# Patient Record
Sex: FEMALE | URBAN - METROPOLITAN AREA
[De-identification: names, ages, dates, MRNs, and addresses within clinical notes are randomized per-mention and may not be internally consistent; named-entity substitution may affect disease eponyms.]

---

## 2017-08-16 ENCOUNTER — MOHS SURGERY-ROUTINE (OUTPATIENT)
Dept: URBAN - METROPOLITAN AREA CLINIC 7 | Facility: CLINIC | Age: 77
Setting detail: DERMATOLOGY
End: 2017-08-16

## 2017-08-16 DIAGNOSIS — L70.0 ACNE VULGARIS: ICD-10-CM

## 2017-08-16 PROCEDURE — 99202 OFFICE O/P NEW SF 15 MIN: CPT

## 2017-08-16 PROCEDURE — 14041 TIS TRNFR F/C/C/M/N/A/G/H/F: CPT

## 2017-08-16 PROCEDURE — 17311 MOHS 1 STAGE H/N/HF/G: CPT

## 2017-08-23 ENCOUNTER — SUTURE REMOVAL (OUTPATIENT)
Dept: URBAN - METROPOLITAN AREA CLINIC 7 | Facility: CLINIC | Age: 77
Setting detail: DERMATOLOGY
End: 2017-08-23

## 2017-08-23 DIAGNOSIS — Z79.899 OTHER LONG-TERM (CURRENT) DRUG THERAPY: ICD-10-CM

## 2017-08-23 DIAGNOSIS — L70.0 ACNE VULGARIS: ICD-10-CM

## 2017-08-23 DIAGNOSIS — L85.3 XEROSIS CUTIS: ICD-10-CM

## 2017-08-23 PROCEDURE — 99024 POSTOP FOLLOW-UP VISIT: CPT

## 2021-04-27 ENCOUNTER — TRANSCRIBE ORDER (OUTPATIENT)
Dept: REGISTRATION | Age: 81
End: 2021-04-27

## 2021-04-27 ENCOUNTER — HOSPITAL ENCOUNTER (OUTPATIENT)
Dept: LAB | Age: 81
Discharge: HOME | End: 2021-04-27
Payer: MEDICARE

## 2021-04-27 DIAGNOSIS — R41.3 MEMORY LOSS: Primary | ICD-10-CM

## 2021-04-27 DIAGNOSIS — R41.3 MEMORY LOSS: ICD-10-CM

## 2021-04-27 LAB — VIT B12 SERPL-MCNC: 327 PG/ML (ref 211–911)

## 2021-04-27 PROCEDURE — 36415 COLL VENOUS BLD VENIPUNCTURE: CPT

## 2021-04-27 PROCEDURE — 86780 TREPONEMA PALLIDUM: CPT

## 2021-04-27 PROCEDURE — 82607 VITAMIN B-12: CPT

## 2021-04-28 LAB — T PALLIDUM AB SER QL IA: NONREACTIVE

## 2021-04-29 LAB
FAX TO INFO,FAXT: NORMAL
FAX TO NUMBER,FAXN: NORMAL

## 2021-06-14 ENCOUNTER — OFFICE VISIT (OUTPATIENT)
Dept: NEUROLOGY | Age: 81
End: 2021-06-14
Payer: MEDICARE

## 2021-06-14 DIAGNOSIS — R41.9 DEFICIT IN COMPREHENSION: ICD-10-CM

## 2021-06-14 DIAGNOSIS — F41.8 ANXIETY ABOUT HEALTH: ICD-10-CM

## 2021-06-14 DIAGNOSIS — R41.3 MEMORY LOSS: Primary | ICD-10-CM

## 2021-06-14 DIAGNOSIS — F81.9 LEARNING DIFFICULTY DUE TO COGNITIVE LIMITATIONS: ICD-10-CM

## 2021-06-14 DIAGNOSIS — R41.844 EXECUTIVE FUNCTION DEFICIT: ICD-10-CM

## 2021-06-14 PROCEDURE — 90791 PSYCH DIAGNOSTIC EVALUATION: CPT | Performed by: PSYCHOLOGIST

## 2021-06-14 NOTE — PROGRESS NOTES
[facility]  Neuroscience   [facility], [address]  Office:  [phone]  Fax: [phone]                  Initial Office Exam  Patient Name: Nicolas Wolff  Age: 68 y.o. Gender: female   Handedness: right handed   Presenting Concern: cognitive complaints  Primary Care Physician: Nubia Rizvi MD  Referring Provider: Lolita Mesa MD      REASON FOR REFERRAL:  This comprehensive and medically necessary neuropsychological assessment was requested to assist a differential diagnosis of cognitive complaints. The use and purpose of this examination, as well as the extent and limitations of confidentiality, were explained prior to obtaining permission to participate. Instructions were provided regarding the necessity to put forth optimal effort and answer questions truthfully in order to obtain reliable and accurate test results. REVIEW OF RECORDS:  Ms. 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 Memory loss first emerged approximately 2 years ago. History is significant for hypertension and diabetes. Socially, Ms. Kedar Hoffman recently moved from [place] to [place] to live with her daughter. ADLs are intact though Ms. Concepcion no longer drives. Behavioral changes and hallucinations have been denied. Medications were reviewed and include aspirin 81 mg, losartan potassium 50 mg, and alprazolam 0.25 mg. An EEG has been ordered. An MRI on 5/14/21 showed the following: There is scattered abnormal FLAIR and T2 signal intensity seen within the subcortical and deep white matter. Given patient's age this is likely the sequelae of chronic microvascular ischemic disease change. There is age-appropriate cerebral and cerebellar atrophy. CLINICAL INTERVIEW:  Ms. Kedar Hoffman was accompanied by her daughter for her initial interview.   Consistent with records, they reported memory loss which has been worsening over the previous 2 years. Neurologic history is negative for seizures, stroke, syncope, and significant head trauma. Sleep disturbance was denied though occasional snoring was reported. Pain complaints were denied. There is no significant history of alcohol or illicit substance use. Tobacco use ceased 40 years ago. Family history is significant for dementia and 2 maternal aunts and stroke in the sister and mother. With regard to emotional functioning, there is no history of psychiatric hospitalization, suicidal ideation, self-harm behaviors, or psychological trauma. However, Ms. Ten Guerrero has been demonstrating anxiety and frustration related to the sale of her home in [place]. For this reason, as needed alprazolam has been used daily. Socially, Ms. Ten Guerrero resides with her daughter. She has been  and  3 times. She currently has a boyfriend who lives in [place]. Ms. Ten Guerrero has 2 adult children. Academically, she completed 7 years of education and subsequently returned for her GED. She worked in the [employer] until 2002. Hobbies include reading. Ms. Ten Guerrero is making some new friends in [place] and still stays connected with her friends from [place]. She is not currently exercising. Functionally, Ms. Ten Guerrero is dependent on her daughter for medication management and bill payment. Her daughter maintains medical and financial POA. Ms. Ten Guerrero no longer drives.       MENTAL STATUS:    Sensorium  Awake, Aware, Alert   Orientation person and situation   Relations cooperative and passive   Eye Contact appropriate   Appearance:  age appropriate   Motor Behavior:  within normal limits   Speech:  normal pitch and normal volume   Vocabulary average   Thought Process: within normal limits   Thought Content free of delusions and free of hallucinations   Suicidal ideations none   Homicidal ideations none   Mood:  euthymic   Affect:  mood-congruent   Memory recent  impaired   Memory remote:  impaired   Concentration:  impaired   Abstraction:  abstract   Insight:  fair   Reliability fair   Judgment:  fair         DIAGNOSTIC IMPRESSIONS:  1. Cognitive Decline: R/O Major Neurocognitive Disorder  2. Anxiety about health      PLAN:  1. Complete a comprehensive neuropsychological assessment to provide a differential diagnosis of presenting concerns as well as to assist with disposition and treatment planning as appropriate. 2. Consider compensatory and remedial cognitive training. 3. Consider nonpharmacological interventions for mood disorder. 4. Consider an adaptive driving evaluation. 5. Consider referral for elder health nurse to provide an in-home functional assessment. 6. Consider placement issues to provide greater structure and supervision to ensure safety, health and well-being. [id number] x 1 Review of records. Face to face interview w/ patient. Determine test protocol: 60 minutes. Total 1 unit      Michelle Carter, PHD  Licensed Clinical Psychologist    This note was created using voice recognition software. Despite editing, there may be syntax errors.

## 2021-07-06 ENCOUNTER — OFFICE VISIT (OUTPATIENT)
Dept: NEUROLOGY | Age: 81
End: 2021-07-06
Payer: MEDICARE

## 2021-07-06 DIAGNOSIS — G30.9 MIXED ALZHEIMER'S AND VASCULAR DEMENTIA (HCC): Primary | ICD-10-CM

## 2021-07-06 DIAGNOSIS — F01.50 MIXED ALZHEIMER'S AND VASCULAR DEMENTIA (HCC): Primary | ICD-10-CM

## 2021-07-06 DIAGNOSIS — F41.8 ANXIETY ABOUT HEALTH: ICD-10-CM

## 2021-07-06 DIAGNOSIS — F02.80 MIXED ALZHEIMER'S AND VASCULAR DEMENTIA (HCC): Primary | ICD-10-CM

## 2021-07-06 PROCEDURE — 96132 NRPSYC TST EVAL PHYS/QHP 1ST: CPT | Performed by: PSYCHOLOGIST

## 2021-07-06 PROCEDURE — 96138 PSYCL/NRPSYC TECH 1ST: CPT | Performed by: PSYCHOLOGIST

## 2021-07-06 PROCEDURE — 96136 PSYCL/NRPSYC TST PHY/QHP 1ST: CPT | Performed by: PSYCHOLOGIST

## 2021-07-06 PROCEDURE — 96139 PSYCL/NRPSYC TST TECH EA: CPT | Performed by: PSYCHOLOGIST

## 2021-07-06 PROCEDURE — 96137 PSYCL/NRPSYC TST PHY/QHP EA: CPT | Performed by: PSYCHOLOGIST

## 2021-07-06 PROCEDURE — 96133 NRPSYC TST EVAL PHYS/QHP EA: CPT | Performed by: PSYCHOLOGIST

## 2021-07-06 NOTE — PROGRESS NOTES
[facility]   [address] [facility], [facility] [address]  Office:  [phone]  Fax: [phone]                  Neuropsychological Evaluation Report    Patient Name: Silvestre Romano  Age: 68 y.o. Gender: female   Handedness: right handed   Presenting Concern: cognitive complaints  Primary Care Physician: Laurel Sandhu MD  Referring Provider: Allison Huertas MD    PATIENT HISTORY (OBTAINED DURING INITIAL CLINICAL EVALUATION):    REASON FOR REFERRAL:  This comprehensive and medically necessary neuropsychological assessment was requested to assist a differential diagnosis of cognitive complaints. The use and purpose of this examination, as well as the extent and limitations of confidentiality, were explained prior to obtaining permission to participate. Instructions were provided regarding the necessity to put forth optimal effort and answer questions truthfully in order to obtain reliable and accurate test results. REVIEW OF RECORDS:  Ms. Ozzy Manzano was referred by neurology for a work-up of cognitive decline which is suggestive of a mixed dementia. Memory loss first emerged approximately 2 years ago. History is significant for hypertension and diabetes. Socially, Ms. Ozzy Manzano recently moved from [place] to [place] to live with her daughter. ADLs are intact though Ms. Barr longer drives. Behavioral changes and hallucinations have been denied. Medications were reviewed and include aspirin 81 mg, losartan potassium 50 mg, and alprazolam 0.25 mg. An EEG has been ordered. An MRI on 5/14/21 showed the following: There is scattered abnormal FLAIR and T2 signal intensity seen within the subcortical and deep white matter. Given patient's age this is likely the sequelae of chronic microvascular ischemic disease change. There is age-appropriate cerebral and cerebellar atrophy.      CLINICAL INTERVIEW:  Ms. Ozzy Manzano was accompanied by her daughter for her initial interview. Consistent with records, they reported memory loss which has been worsening over the previous 2 years. Neurologic history is negative for seizures, stroke, syncope, and significant head trauma. Sleep disturbance was denied though occasional snoring was reported. Pain complaints were denied. There is no significant history of alcohol or illicit substance use. Tobacco use ceased 40 years ago. Family history is significant for dementia in 2 maternal aunts and stroke in the sister and mother. With regard to emotional functioning, there is no history of psychiatric hospitalization, suicidal ideation, self-harm behaviors, or psychological trauma. However, Ms. Glenny Haq has been demonstrating anxiety and frustration related to the sale of her home in [place]. For this reason, as needed alprazolam has been used daily. Socially, Ms. Glenny Haq resides with her daughter. She has been  and  3 times. She currently has a boyfriend who lives in [place]. Ms. Glenny Haq has 2 adult children. Academically, she completed 7 years of education and subsequently returned for her GED. She worked in the [employer] until 2002. Hobbies include reading. Ms. Glenny Haq is making some new friends in [place] and still stays connected with her friends from [place]. She is not currently exercising. Functionally, Ms. Glenny Haq is dependent on her daughter for medication management and bill payment. Her daughter maintains medical and financial POA. Ms. Glenny Haq no longer drives.       MENTAL STATUS:    Sensorium  Awake, Aware, Alert   Orientation person and situation   Relations cooperative and passive   Eye Contact appropriate   Appearance:  age appropriate   Motor Behavior:  within normal limits   Speech:  normal pitch and normal volume   Vocabulary average   Thought Process: within normal limits   Thought Content free of delusions and free of hallucinations   Suicidal ideations none Homicidal ideations none   Mood:  euthymic   Affect:  mood-congruent   Memory recent  impaired   Memory remote:  impaired   Concentration:  impaired   Abstraction:  abstract   Insight:  fair   Reliability fair   Judgment:  fair     METHODS OF ASSESSMENT (Current Evaluation):  Clinician Administered: Adaptive Behavior Assessment System (ABAS-3)  Clinical Assessment of Geriatric Emotional Status (CASE)  Clock Drawing Task  Geriatric Depression Scale: Short Form (GDS)  Modified Mini-Mental Status Exam (3MS)  Test of Premorbid Functioning (TOPF)    Technician Administered Trenton Siddiqi CSP): Animals (Category Fluency)  Darci's Continuous Performance Test  Controlled Oral Word Association Test  Neuropsychological Assessment Battery-Select Subtests  RBANS-A-select subtests  Reliable Digit Span  [place] Functional Living Scale  Trailmaking Test      TEST OBSERVATIONS:  Ms. Willis Jerez arrived promptly for the testing session. Dress and grooming were appropriate; physical presentation was unchanged from that observed during the clinical interview. Speech was fluent and coherent with normal rate, rhythm, tone, and volume. Comprehension difficulties were noted. No unusual behaviors or psychomotor abnormalities were observed. Thought process was logical, relevant, and focused. Thought content showed no apparent delusional ideation. Auditory and visual hallucinations were denied and there was no obvious response to internal stimuli. Affect was congruent with the euthymic, though sometimes frustrated, mood conveyed. Ms. Willis Jerez was adequately cooperative and appeared to put forth good effort throughout this examination. Rapport with the examiner was adequately established and maintained. Performance motivation was objectively measured with one instrument (Reliable Digit Span); Ms. Willis Jerez produced a normal score on this measure. Accordingly, test findings below do not appear to be the product of disingenuous effort.   Given the above observations, plus comments contained in the Mental Status section, the results of this examination are regarded as reasonably reliable and valid. TEST RESULTS:  Quantitative test results are derived from comparisons to age and education corrected cohort normative data, where applicable. Percentiles are included in these instances. Qualitative test results are determined using clinical observations. General Orientation and Awareness:       Orientation to person yes   Time no  Place yes  Circumstance yes                     Sensory-Perceptual and Motor Functioning:    Visual and auditory acuity:  Glasses       Gait and balance: WNL                 Cognitive Screening: On the Modified Mini-Mental Status Exam, Ms. David Corado obtained a Severely Impaired score. Difficulties were noted in the following areas: Mental reversal, immediate and delayed spontaneous memory, orientation (misidentified the year, season, date, month, day of the week, and current city), verbal fluency, and figure copy. Clock drawing was significant for misalignment of clock numbers and missing clock hands. Attention/Concentration:       Simple visuomotor tracking (12 percentile)                    Low Average  Digits forward (31 percentile)                      Average  Digits backward (24 percentile)           Low Average     On a continuous performance measure, Ms. David Corado produced 2 atypical scores, which is associated with a moderate likelihood of having a disorder characterized by attention deficits.     Visuospatial and Constructional Praxis:     Visual discrimination (46 percentile)                               Average   Design construction (2 percentile)                    Moderately Impaired    Language:             Phonemic verbal fluency (42 percentile)                               Average   Categorical verbal fluency (<1 percentile)                   Severely Impaired  Auditory comprehension (2 percentile) Moderately Impaired   Naming (<1 percentile)            Severely Impaired    Memory and Learning:       Word list immediate recall (<1 percentile)                Severely Impaired  Word list delayed recall (<1 percentile)                 Severely Impaired  Forced Choice Recognition (75 percentile)     Above Average  Shape learning immediate recognition (21 percentile)    Low Average    Shape learning delayed recognition (8 percentile)               Mildly Impaired  Forced Choice Recognition (90 percentile)     Above Average  Story learning immediate recall (<1 percentile)     Severely Impaired  Story learning delayed recall (<1 percentile)                           Severely Impaired    Cognitive Tests of Executive Functioning:     Ability to think flexibly, Trailmaking B [Discontinued due to confusion and errors]     Intellectual and Basic Cognitive Functioning:  ACS Test of pre-morbid functioning reading recognition lower limit estimated WAIS-IV FSIQ score:       Estimated full scale IQ: 105   Percentile: 63     Rating: Average    Adaptive Behavior Measure for Independent Living ([place] Functional Living Scale):  Time                 <2 percentile  Severely Impaired  Money and calculation   <2 percentile  Severely Impaired  Communication     <2 percentile  Severely Impaired  Memory      <2 percentile  Severely Impaired  Total                 <1 percentile  Severely Impaired    Adaptive Behavior (Adaptive Behavior Assessment System)  General Adaptive Composite:  86   Percentile: 18 Low Average   Conceptual:  82    Percentile: 12 Low Average   Social:  89    Percentile: 23 Low Average   Practical:  88    Percentile: 21 Low Average    Emotional Functioning:  Screening of Emotional/Psychiatric Status:  Level of self-reported anxiety    (0/75)  Minimal   Cognitive  Minimal   Affective  Minimal   Somatic  Minimal  Level of self-reported depression   (2/15)  Normal Range    On a measure of general emotional functioning completed by Ms. Benavides's daughter, she reported observing the following: Cognitive deficits and low energy. IMPRESSIONS/RECOMMENDATIONS:  Test performance was consistent with an advancing dementia of at least moderate severity. 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 However, it should be noted that recognition memory was intact, which suggests that this is more dysexecutive in nature as opposed to predominantly amnestic. Regardless, medication for cognitive enhancement might be beneficial if not contraindicated. With regard to pharmacotherapy, it might also be worthwhile for Ms. Benavides's medical team to review her regular use of benzodiazepines as there may be an alternative approach to anxiety that is not as likely to impact cognitive functioning. Fortunately, Ms. Gurmeet August already has the assistance of her daughter for medication management and bill payment. This is appropriate as I do not believe that Ms. Gurmeet August has capacity for medical and financial decision making, voting, marrying, or owning a firearm. Serial testing in 12 months is suggested to compare with baseline and to inform treatment accordingly. In the interim, Ms. Gurmeet August is encouraged to adopt and adhere to a brain fitness regimen (sleep hygiene, physician-approved level of exercise, healthy diet, mentally stimulating activities, minimal alcohol consumption, avoid nicotine). Her family is encouraged to consider the general recommendations below. DIAGNOSTIC IMPRESSIONS:  1. Mixed Dementia, moderate  2. Anxiety about health    GENERAL RECOMMENDATIONS:  · When planning daily activities, consider the following:  · Focus on abilities rather than on limitations as much as possible. Caregiver should give honest praise and encouragement. · A predictable routine is important. It decreases anxiety and reduce his need to adapt to change  · Break down tasks into simple steps.   Provide one step at a time instructions or cues and use short words and simple sentences  · Except that each daily task will gradually take more time to do as the disease progresses  · Encourage activities that have little risk of failure. Give credit for trying rather than for completing a task  · Reduce distractions such as background noise to improve the ability to concentrate on task  · Allow the person to make choices to improve self-esteem. Confusion, anxiety, or agitation in response to a choice or clues that making that particular decision may be too difficult  · People with dementia are highly sensitive to the moods of people around him. Caregiver should try to remain calm and patient and avoid criticism. · Early fatigue and short attention span are common in individuals with dementing diseases. When possible, plan activities at the best time of day and allow time for rest.     · Individuals with dementia symptoms often have heightened sensitivities and can therefore be more vulnerable to distress and agitation. Creating a calm home will be important; the following are recommended:  · If reflections seem to call cause visual disturbance, cover reflective surfaces such as mirrors, windows, or glass tables or shiny appliances. · A flickering television can be disturbing, or could even induce hallucinations. · Reduce background noise such as radio or television. This may lessen confusion or anxiety and also increase the ability to concentrate. · Limit the number of activities or visitors if they are causing fatigue or agitation  · Allow plenty of time to engage in everyday tasks without rushing.   This may mean that to get to the doctor's appointment on time, you may need to begin to get ready to hours beforehand  · Simplify the living areas of the home if too much clutter is confusing    · Plan ahead for caregiver emergencies  · Make arrangements for others to assume care at a moments notice: Family, friend, neighbor, home health care agency offering 24-hour care, group home, or nursing home  · Post emergency information and a prominent place such as the refrigerator door or next to the phone. Include: Family names, addresses, work and home phone numbers, whom to contact in an emergency, whom you have selected to make healthcare decisions for you if you are incapacitated, doctor's name and phone number (including weekend and evenings), name and address of preferred hospital, insurance names and policy numbers, the list of medications, schedules, and where they are kept for both the person with dementia and the caregiver    · Fall prevention  · Use nonskid wax or vinyl floors  · Remove any throw rugs are fast and then to the floor  · Use nonskid surfaces such as textured strips or decals in the bathtub or shower  · Install grab bars near the toilet and in the bath. Consider a raised toilet seat, shower stool, and a hand-held shower  · Make sure hallways and stairways are well lit and free of clutter  · Use a night light in the back bedroom and/or bathroom  · Install secure railings on all stairs and marked the edges of stairs with brightly colored tape. Install [place] on the top of stairs if needed  · Remove any tripping hazards such as extension cords  · Encourage the use of sturdy chairs that have high seats and strong arms  · Make sure the person with dementia wear shoes that are comfortable and safe  · Correct or treat other health problems that may increase the risk of falling such as hearing and vision loss or arthritis  · Closely monitor medication use, especially those that could cause dizziness or confusion. Check with the doctor immediately if you suspect a medication reaction  · Consider the use of an emergency alarm to alert others if there is a fall    · Fire hazards  · Make sure there is a smoke alarm that works.   If possible, connected to the apartment office or fire station so that someone in addition to the person with dementia will be alerted if it goes off  · Explore the possibility of a microwave. If the person with Alzheimer's disease already knows how to operate a microwave or can learn to do so, remove the knobs of the regular stove or disconnected  · Limit the amount of cooking by bringing and prepared meals, hiring a homemaker, or arranging for home delivered meals  · Call the fire department to see whether there is a program for registering persons with disabilities so that in case of a fire they would be rescued first      Thank you for allowing me the opportunity to assist you in Ms. Benavides's care. Please do not hesitate to contact me should you have additional questions that I may not have addressed. 67371 x 1  96137 x 1  96138 x 1  96139 x 4  96132 x 1  96133 x 1    [facility], Ph.D.   Licensed Clinical Psychologist        Time Documentation:     32534 x 1   26510 x 1 Neuropsych testing/data gathering by Neuropsychologist: (1 hour). 33927 x1 (first 30 minutes), 96137 x 1 (additional 30 minutes)     96138 x 1  96139 x 4 Test Administration/Data Gathering By Technician: (2.5 hours). 32026 x 1 (first 30 minutes), 96139 x 4 (each additional 30 minutes)     96132 x 1  96133 x 1 Testing Evaluation Services by Neuropsychologist (1 hour, 50 minutes), 54450 x1 (first hour), 02206 x1 (additional 50 minutes)     The above includes: Record review. Review of history provided by patient. Review of collaborative information. Testing by Clinician. Review of raw data. Scoring. Report writing of individual tests administered by Clinician. Integration of individual tests administered by psychometrist with NSE/testing by clinician, review of records/history/collaborative information, case Conceptualization, treatment planning, clinical decision making, report writing, coordination Of Care.  Psychometry test codes as time spent by psychometrist administering and scoring neurocognitive/psychological tests under supervision of neuropsychologist.  Integral services including scoring of raw data, data interpretation, case conceptualization, report writing etcetera were initiated after the patient finished testing/raw data collected and was completed on the date the report was signed. This note will not be viewable in [address] This note was created using voice recognition software. Despite editing, there may be syntax errors. I have reviewed the documentation provided by Dr. Ange Thompson, PhD, Devang Davis. Dr. Ozzy Villar is in her second year of Fellowship in Clinical Neuropsychology. Dr. Ozzy Villar is licensed and credentialed to practice in the [facility], is providing the current services via her NPI and licensure, and had been providing similar services prior to her employment with [address] [place] [address]. No additional insurance billing is done by me on her cases, my NPI is not being used, etc.   I have not had any face to face engagement with her patients, and am providing supervision and consultation services with her as she works towards advancing her career and subspecialities. I have reviewed the history, the neurocognitive and psychological test results, the medical records available, and the impressions and recommendations generated by Dr. Ozzy Villar. We have engaged in peer to peer supervision as needed. I have reviewed history noted in the records, the tests administered, the test scores, and the overall case history and profile and report generated by Dr. Ozzy Villar. Dr. Senthil Restrepo clinical case formulation, diagnostic impressions, and the proposed management plans/treatment recommendations are her own and based on her clinical training, level of expertise, and judgment. Any additional comments, concerns, or recommendations that I am making are offered here: Moderate dementia, as noted above. AUGUSTIN Patricio  Neuropsychology

## 2021-07-20 ENCOUNTER — VIRTUAL VISIT (OUTPATIENT)
Dept: NEUROLOGY | Age: 81
End: 2021-07-20
Payer: MEDICARE

## 2021-07-20 DIAGNOSIS — F41.8 ANXIETY ABOUT HEALTH: ICD-10-CM

## 2021-07-20 DIAGNOSIS — F02.80 MIXED ALZHEIMER'S AND VASCULAR DEMENTIA (HCC): Primary | ICD-10-CM

## 2021-07-20 DIAGNOSIS — G30.9 MIXED ALZHEIMER'S AND VASCULAR DEMENTIA (HCC): Primary | ICD-10-CM

## 2021-07-20 DIAGNOSIS — F01.50 MIXED ALZHEIMER'S AND VASCULAR DEMENTIA (HCC): Primary | ICD-10-CM

## 2021-07-20 PROCEDURE — 90832 PSYTX W PT 30 MINUTES: CPT | Performed by: PSYCHOLOGIST

## 2021-07-20 NOTE — PROGRESS NOTES
Interactive Psychotherapy/office feedback        Interactive office feedback session with Ms. Cynthia Soulier and her adult daughter. I reviewed the results of the recent Neuropsychological Evaluation  including the observed areas of neurocognitive strengths and weaknesses. Education was provided regarding my diagnostic impressions, and treatment plan/options were discussed. I also answered numerous questions related to the clinical findings, including the various methods to improve cognition and mood. CBT, psychoeducation, and supportive psychotherapy techniques were utilized. Ms. Cynthia Soulier and her family were encouraged to connect with the [facility] [address]. Prior to seeing the patient I reviewed the records, including the previously completed report, the records in [place], and any updated visits from other providers since I saw the patient last.       Diagnoses:      1. Mixed Dementia, moderate  2. Anxiety about health    The patient will follow up with the referring provider, and reported being very pleased with the services provided. [facility] [address] psychotherapy 30 Minutes      This note was created using voice recognition software. Despite editing, there may be syntax errors. Lucina Casanova is a 68 y.o. female who was evaluated by an audio only encounter for concerns as above. Patient identification was verified prior to start of the visit. Pursuant to the emergency declaration under the [address] [facility] [place], 1135 waiver authority and the [employer] Act, this Virtual Visit was conducted, with patient's (and/or legal guardian's) consent, to reduce the patient's risk of exposure to COVID-19 and provide necessary medical care. Services were provided through a synchronous discussion virtually to substitute for in-person clinic visit. I was at home. The patient was at home.